# Patient Record
Sex: FEMALE | Race: WHITE | HISPANIC OR LATINO
[De-identification: names, ages, dates, MRNs, and addresses within clinical notes are randomized per-mention and may not be internally consistent; named-entity substitution may affect disease eponyms.]

---

## 2022-04-28 ENCOUNTER — APPOINTMENT (OUTPATIENT)
Dept: ORTHOPEDIC SURGERY | Facility: CLINIC | Age: 57
End: 2022-04-28
Payer: OTHER MISCELLANEOUS

## 2022-04-28 VITALS — WEIGHT: 160 LBS | HEIGHT: 62 IN | BODY MASS INDEX: 29.44 KG/M2

## 2022-04-28 DIAGNOSIS — S93.402D SPRAIN OF UNSPECIFIED LIGAMENT OF LEFT ANKLE, SUBSEQUENT ENCOUNTER: ICD-10-CM

## 2022-04-28 PROCEDURE — 99213 OFFICE O/P EST LOW 20 MIN: CPT

## 2022-04-28 PROCEDURE — 99072 ADDL SUPL MATRL&STAF TM PHE: CPT

## 2022-04-28 NOTE — PHYSICAL EXAM
[Left] : left foot and ankle [NL (40)] : plantar flexion 40 degrees [NL 30)] : inversion 30 degrees [NL (20)] : eversion 20 degrees [5___] : [facility] 5[unfilled]/5 [2+] : posterior tibialis pulse: 2+ [Normal] : saphenous nerve sensation normal [] : non-antalgic

## 2022-04-28 NOTE — HISTORY OF PRESENT ILLNESS
[Work related] : work related [0] : 0 [Sleep] : sleep [Ice] : ice [Full time] : Work status: full time [de-identified] : WC 3/1/22: Patient returns for her LT ankle sprain. Reports she lost her balance while getting into bus at work and took a misstep. She is doing much better and has no pain at this time.  She recently stopped icing and taking medication. She is s/p L ankle arthroscopy with microfx ocd by Dr. Prather 6/28/21. She She is currently working full duty as . [] : no [FreeTextEntry1] : left ankle [FreeTextEntry3] : 3-1-22 [FreeTextEntry6] : cramps in toes last night [FreeTextEntry9] : anti inflammatories [de-identified] : Dr. Nascimento [de-identified] : June 2021 with Dr. Prather [de-identified] :

## 2022-04-28 NOTE — ASSESSMENT
[FreeTextEntry1] : Patient has recovered well, and can slowly resume activities as tolerated.  Home stretching exercises were encouraged to maintain flexibility. Ice/nsaids can be used as needed.\par

## 2022-04-28 NOTE — WORK
[Sprain/Strain] : sprain/strain [Was the competent medical cause of the injury] : was the competent medical cause of the injury [Are consistent with the injury] : are consistent with the injury [Consistent with my objective findings] : consistent with my objective findings [Does not reveal pre-existing condition(s) that may affect treatment/prognosis] : does not reveal pre-existing condition(s) that may affect treatment/prognosis [N/A] : : Not Applicable [No Rx restrictions] : No Rx restrictions. [I provided the services listed above] :  I provided the services listed above. [FreeTextEntry1] : good

## 2023-07-19 ENCOUNTER — APPOINTMENT (OUTPATIENT)
Dept: ORTHOPEDIC SURGERY | Facility: CLINIC | Age: 58
End: 2023-07-19
Payer: COMMERCIAL

## 2023-07-19 DIAGNOSIS — M54.2 CERVICALGIA: ICD-10-CM

## 2023-07-19 PROCEDURE — 99214 OFFICE O/P EST MOD 30 MIN: CPT

## 2023-07-19 PROCEDURE — 73030 X-RAY EXAM OF SHOULDER: CPT | Mod: RT

## 2023-07-19 RX ORDER — CYCLOBENZAPRINE HYDROCHLORIDE 10 MG/1
10 TABLET, FILM COATED ORAL
Qty: 30 | Refills: 0 | Status: ACTIVE | COMMUNITY
Start: 2023-07-19 | End: 1900-01-01

## 2023-07-19 RX ORDER — METHYLPREDNISOLONE 4 MG/1
4 TABLET ORAL
Qty: 1 | Refills: 0 | Status: ACTIVE | COMMUNITY
Start: 2023-07-19 | End: 1900-01-01

## 2023-07-19 NOTE — HISTORY OF PRESENT ILLNESS
[de-identified] : 57-year-old female presenting to the office with right shoulder pain and right-sided neck pain for 2-3 weeks.  Denies any specific injury or trauma.  Patient reports she is a  and uses her right arm to constantly pull the door.  Admits to significant retroscapular discomfort.  Also has noticed new onset of anterior pain.  Patient denies any radicular symptoms.  She denies any numbness or tingling.  Pain is worse with motion overhead.  She is having difficulty sleeping at night.

## 2023-07-19 NOTE — PHYSICAL EXAM
[Right] : right shoulder [There are no fractures, subluxations or dislocations. No significant abnormalities are seen] : There are no fractures, subluxations or dislocations. No significant abnormalities are seen [Type 2 acromion] : Type 2 acromion [de-identified] : Constitutional: The general appearance of the patient is well developed, well nourished, no deformities and well groomed. Normal \par \par Gait: Gait and function is as follows: normal gait. \par \par Skin: Head and neck visualized skin is normal. Left upper extremity visualized skin is normal. Right upper extremity visualized skin is normal. Thoracic Skin of the thoracic spine shows visualized skin is normal. \par \par Cardiovascular: palpable radial pulse bilaterally, good capillary refill in digits of the bilateral upper extremities and no temperature or color changes in the bilateral upper extremities. \par \par Lymphatic: Normal Palpation of lymph nodes in the cervical. \par \par Neurologic: fine motor control in the bilateral upper extremities is intact. Deep Tendon Reflexes in Upper and Lower Extremities Negative Miller's in the bilateral upper extremities. The patient is oriented to time, place and person. Sensation to light touch intact in the bilateral upper extremities. Mood and Affect is normal. \par \par Right Shoulder: Inspection of the shoulder/upper arm is as follows: Stable shoulder. Palpation of the shoulder/upper arm is as follows: There is tenderness at the AC joint, proximal biceps tendon and supraspinatus tendon. Range of motion of the shoulder is as follows: Pain with internal rotation, external rotation, abduction and forward flexion. Strength of the shoulder is as follows: Supraspinatus 4/5. External Rotation 4/5. Internal Rotation 4/5. Infraspinatus 5/5 4/5. Deltoid 5/5 Ligament Stability and Special Tests of the shoulder is as follows: Neer test is positive. Gray' test is positive. Speed's test is positive. \par \par Left Shoulder: Inspection of the shoulder/upper arm is as follows: There is a full, pain-free, stable range of motion of the shoulder with normal strength and no tenderness to palpation. \par \par Neck: Inspection / Palpation of the cervical spine is as follows: There is a mild restricted range of motion of the cervical spine. Increase tone and mild tenderness to palpation. Stable ROM of cervical spine. \par \par Back, including spine: Inspection / Palpation of the thoracic/lumbar spine is as follows: There is a full, pain free, stable range of motion of the thoracic spine with a normal tone and not tenderness to palpation.. \par \par

## 2023-07-19 NOTE — DISCUSSION/SUMMARY
[de-identified] : RUE: TTP LHBT, pain with bear hug and belly press\par +Speeds referred to biceps, + Obriens\par Pain and 90/90 ER \par \par 57-year-old female with recent anterior shoulder and right-sided neck pain/trapezial pain for 2 weeks but ongoing retroscapular pain for several months\par X-rays today are nondiagnostic.\par Patient has physical exam consistent with scapular dyskinesia to the right side.\par Recommended patient begin a formal course of supervised physical therapy.\par Patient was prescribed a Medrol Dosepak as well as Flexeril to help alleviate cervical spasm.\par She will follow-up in 4 to 6 weeks for repeat evaluation.\par Discussed if pain does not improve we could consider MRI.\par \par (1) We discussed a comprehensive treatment plans that included a prescription management plan involving the use of prescription strength medications to include Ibuprofen 600-800 mg TID, versus 500-650 mg Tylenol. We also discussed prescribing topical diclofenac (Voltaren gel) as well as once daily Meloxicam 15 mg.\par \par (2) The patient has More Than One chronic injuries/illnesses as outlined, discussed, and documented by ICD 10 codes listed, as well as the HPI and Plan section.\par There is a moderate risk of morbidity with further treatment, especially from use of prescription strength medications and possible side effects of these medications which include upset stomach and cardiac/renal issues with long term use were discussed.\par \par (3) I recommended that the patient follow-up with their medical physician to discuss any significant specific potential issues with long term use such as interactions with current medications or with exacerbation of underlying medical morbidities.\par \par

## 2023-08-30 ENCOUNTER — APPOINTMENT (OUTPATIENT)
Dept: ORTHOPEDIC SURGERY | Facility: CLINIC | Age: 58
End: 2023-08-30
Payer: COMMERCIAL

## 2023-08-30 DIAGNOSIS — M25.511 PAIN IN RIGHT SHOULDER: ICD-10-CM

## 2023-08-30 DIAGNOSIS — G25.89 OTHER SPECIFIED EXTRAPYRAMIDAL AND MOVEMENT DISORDERS: ICD-10-CM

## 2023-08-30 DIAGNOSIS — M75.21 BICIPITAL TENDINITIS, RIGHT SHOULDER: ICD-10-CM

## 2023-08-30 PROCEDURE — 99213 OFFICE O/P EST LOW 20 MIN: CPT

## 2023-08-30 NOTE — DISCUSSION/SUMMARY
[de-identified] : RUE + Neer, + Gray Pain with resisted abdcution Painful arc pain and weakness with cuff testing, + Drop arm test +TTP LHBT, + Speed   57-year-old female with recent anterior shoulder and right-sided neck pain x 2.5 months Initial x-rays were nondiagnostic. Patient completed Medrol Dosepak with some relief. She has now completed 6 weeks of conservative treatment including physical therapy and home exercise program. Upon repeat evaluation today patient does have some weakness concerning for rotator cuff tear. At this stage we are recommending an MRI to rule out possibility of rotator cuff tear. Based on the patient's history and physical exam findings, I am concerned about the possibility of a full-thickness rotator cuff tear. The patient has pain and subjective weakness consistent with this diagnosis. Therefore, I recommend an MRI to evaluate for a rotator cuff tear. This will also aid in evaluating for injury to biceps labral complex as well as for any signs of impingement.  Discussed if there is a full-thickness rotator cuff tear she would be a candidate for arthroscopic repair.  She will follow-up in 4 weeks to review MRI results. Likely consider subacromial injection at that visit.   (1) We discussed a comprehensive treatment plans that included a prescription management plan involving the use of prescription strength medications to include Ibuprofen 600-800 mg TID, versus 500-650 mg Tylenol. We also discussed prescribing topical diclofenac (Voltaren gel) as well as once daily Meloxicam 15 mg.  (2) The patient has More Than One chronic injuries/illnesses as outlined, discussed, and documented by ICD 10 codes listed, as well as the HPI and Plan section. There is a moderate risk of morbidity with further treatment, especially from use of prescription strength medications and possible side effects of these medications which include upset stomach and cardiac/renal issues with long term use were discussed.  (3) I recommended that the patient follow-up with their medical physician to discuss any significant specific potential issues with long term use such as interactions with current medications or with exacerbation of underlying medical morbidities.

## 2023-08-30 NOTE — PHYSICAL EXAM
[de-identified] : Constitutional: The general appearance of the patient is well developed, well nourished, no deformities and well groomed. Normal \par  \par  Gait: Gait and function is as follows: normal gait. \par  \par  Skin: Head and neck visualized skin is normal. Left upper extremity visualized skin is normal. Right upper extremity visualized skin is normal. Thoracic Skin of the thoracic spine shows visualized skin is normal. \par  \par  Cardiovascular: palpable radial pulse bilaterally, good capillary refill in digits of the bilateral upper extremities and no temperature or color changes in the bilateral upper extremities. \par  \par  Lymphatic: Normal Palpation of lymph nodes in the cervical. \par  \par  Neurologic: fine motor control in the bilateral upper extremities is intact. Deep Tendon Reflexes in Upper and Lower Extremities Negative Miller's in the bilateral upper extremities. The patient is oriented to time, place and person. Sensation to light touch intact in the bilateral upper extremities. Mood and Affect is normal. \par  \par  Right Shoulder: Inspection of the shoulder/upper arm is as follows: Stable shoulder. Palpation of the shoulder/upper arm is as follows: There is tenderness at the AC joint, proximal biceps tendon and supraspinatus tendon. Range of motion of the shoulder is as follows: Pain with internal rotation, external rotation, abduction and forward flexion. Strength of the shoulder is as follows: Supraspinatus 4/5. External Rotation 4/5. Internal Rotation 4/5. Infraspinatus 5/5 4/5. Deltoid 5/5 Ligament Stability and Special Tests of the shoulder is as follows: Neer test is positive. Gray' test is positive. Speed's test is positive. \par  \par  Left Shoulder: Inspection of the shoulder/upper arm is as follows: There is a full, pain-free, stable range of motion of the shoulder with normal strength and no tenderness to palpation. \par  \par  Neck: Inspection / Palpation of the cervical spine is as follows: There is a mild restricted range of motion of the cervical spine. Increase tone and mild tenderness to palpation. Stable ROM of cervical spine. \par  \par  Back, including spine: Inspection / Palpation of the thoracic/lumbar spine is as follows: There is a full, pain free, stable range of motion of the thoracic spine with a normal tone and not tenderness to palpation.. \par  \par

## 2023-08-30 NOTE — HISTORY OF PRESENT ILLNESS
[de-identified] : 57-year-old female presenting to the office with right shoulder pain and right-sided neck pain for 2-3 weeks.  Denies any specific injury or trauma.  Patient reports she is a  and uses her right arm to constantly pull the door.  Admits to significant retroscapular discomfort.  Also has noticed new onset of anterior pain.  Patient denies any radicular symptoms.  She denies any numbness or tingling.  Pain is worse with motion overhead.  She is having difficulty sleeping at night.  8/30/23: Patient presents today for repeat evaluation of right shoulder pain.  Pain has been ongoing for nearly 2 to 3 months.  Previous   Medrol Dosepak provided moderate relief.  She has completed now 6 weeks of physical therapy with some progress.  Continues to note significant lateral discomfort that is worse with overhead range of motion.

## 2023-09-09 ENCOUNTER — RESULT REVIEW (OUTPATIENT)
Age: 58
End: 2023-09-09

## 2024-01-03 ENCOUNTER — APPOINTMENT (OUTPATIENT)
Dept: ORTHOPEDIC SURGERY | Facility: CLINIC | Age: 59
End: 2024-01-03
Payer: COMMERCIAL

## 2024-01-03 VITALS — WEIGHT: 160 LBS | HEIGHT: 62 IN | BODY MASS INDEX: 29.44 KG/M2

## 2024-01-03 DIAGNOSIS — M17.11 UNILATERAL PRIMARY OSTEOARTHRITIS, RIGHT KNEE: ICD-10-CM

## 2024-01-03 PROCEDURE — 99202 OFFICE O/P NEW SF 15 MIN: CPT | Mod: 25

## 2024-01-03 PROCEDURE — 73562 X-RAY EXAM OF KNEE 3: CPT | Mod: RT

## 2024-01-03 NOTE — DISCUSSION/SUMMARY
[de-identified] : Khushi has mild right knee osteoarthritis.  She states today she has no pain which is has occasional crepitus in the knee which concerned her.  She does have a history of meniscus tear in the contralateral knee and now has bone-on-bone arthritis after knee arthroscopy.  We discussed treatment options in the form of meloxicam, physical therapy, activity modifications, and possible injections.  Given that she is currently not having any pain she deferred all this was satisfied with reassurance today.  We discussed that if her knee becomes painful she could return to see me for previously mentioned treatments.  All of her questions were answered she is in agreement with this plan.  She can follow-up with me as needed.

## 2024-01-03 NOTE — HISTORY OF PRESENT ILLNESS
[7] : 7 [5] : 5 [Dull/Aching] : dull/aching [Sharp] : sharp [Intermittent] : intermittent [Nothing helps with pain getting better] : Nothing helps with pain getting better [de-identified] : HONOROI OLVERA  is a 58 year F here today for R knee crepitus but no pain.    Injury: No  Instability: Yes  Clicking/popping: yes  Does knee lock up: no Swelling/effusions: no Exacerbating activities/motions: flexion  Previous treatment: Surgery: no NSAIDs: no PT: no Injections: no Brace: no Activity mods: no   Occupation:  Recreational Activities: walking [] : no [FreeTextEntry1] : RT Knee [FreeTextEntry5] : RT knee pain. Hx of OA in the knee. Has had pain for a couple weeks. Denies injury. Denies N/T. Pt states she has moment of instability.

## 2024-01-03 NOTE — IMAGING
[de-identified] : Gait: Non-antalgic Alignment: Neutral Scars: None   R Knee: Tenderness: None ROM: 0-120 degrees Crepitus: None Effusion: None Warmth: None   Meniscal Signs: Pain on terminal extension: None Pain on terminal flexion: None McMurrays: Neg Thessaly's: Neg   Ligament Exam: Lachman: neg Post Drawer: neg Valgus stress: neg at 0 and 30 degrees Varus stress: neg at 0 and 30 degrees Pivot shift: neg Dial test: neg at 30 degrees, neg at 90 degrees   Strength: Quads: 5/5 Hamstrin/5 DF/PF/EHL 5/5   Sensation grossly intact in all dermatomes, DP/PT 2+, brisk capillary refill distally [Right] : right knee [All Views] : anteroposterior, lateral, skyline, and anteroposterior standing [There are no fractures, subluxations or dislocations. No significant abnormalities are seen] : There are no fractures, subluxations or dislocations. No significant abnormalities are seen [Moderate tricompartmental OA medial narrowing] : Moderate tricompartmental OA medial narrowing

## 2024-05-20 ENCOUNTER — APPOINTMENT (OUTPATIENT)
Dept: ORTHOPEDIC SURGERY | Facility: CLINIC | Age: 59
End: 2024-05-20
Payer: COMMERCIAL

## 2024-05-20 VITALS — HEIGHT: 62 IN | WEIGHT: 161 LBS | BODY MASS INDEX: 29.63 KG/M2

## 2024-05-20 PROCEDURE — 20526 THER INJECTION CARP TUNNEL: CPT | Mod: LT

## 2024-05-20 PROCEDURE — 73120 X-RAY EXAM OF HAND: CPT | Mod: LT

## 2024-05-20 PROCEDURE — 99213 OFFICE O/P EST LOW 20 MIN: CPT | Mod: 25

## 2024-05-20 NOTE — HISTORY OF PRESENT ILLNESS
[de-identified] : Patient presents with left hand pain for the past few weeks.  she is RHD and she states she has been nursing the Right hand and for compensation been using the left more.  She notes pain at the palmar aspect of the 2-4th MC heads region and recently has noted tingling to the 2-4 digits tips.  She deniesa ny injury.  She notes it wakes her up at night.  She has been using CT braces which helped a little at night

## 2024-05-20 NOTE — DISCUSSION/SUMMARY
[de-identified] : Extensive discussion of the options was had with the patient. This discussion included both surgical and nonsurgical options. Options including but not limited to cortisone injection, viscosupplementation, physical therapy, oral anti-inflammatories, MRI were discussed with the patient. We also discussed the option of observation, allowing the patient to continue rest, ice, prescription drug NSAIDs and following up if the condition does not improve. Time was taken to go over any questions the patient had in regard to any of the treatment plans described.   Patient reports at this time using advil , and pain/discomfort is greatest at night. Patient reports the majority of the discomfort is in the 2-4th fingers.  Plan at this time is to go forward with CSI in the left Carpal Tunnel. f/u in 1 month  Entered by Claudy Rangel acting as scribe. Dr. Armendariz Attestation The documentation recorded by the scribe, in my presence, accurately reflects the service I, Dr. Armendariz, personally performed, and the decisions made by me with my edits as appropriate.

## 2024-05-20 NOTE — PHYSICAL EXAM
[de-identified] : Constitutional: The patient appears well developed, well nourished. Examination of patients ability to communicate functionally was normal.       Neurologic: Coordination is normal. Alert and oriented to time, place and person. No evidence of mood disorder, calm affect.            LEFT HAND: Inspection of the hand/wrist is as follows: NO thenar atrophy. No swelling, ecchymosis, erythema, lacerations/abrasions, rashes, masses, deformity, nail deformity and clubbing of fingers.       Palpation of the hand/wrist is as follows: No tenderness over wrist or hand, and no palpable masses or nodules.       Range of motion of the hand/wrist is as follows in degrees:   Wrist dorsiflexion:  75    volarflexion:  85    radial deviation:  25     ulnar deviation:  40     full range of motion of wrist, full range of motion of hand, no pain with range of motion and good active flexion and extension of all finger joints.       Strength testing of the hand/wrist is as follows:    Wrist dorsiflexion strength:  5/5    Wrist volarflexion strength:   5/5    Grasp strength:   5/5    Finger abductor strength: 5/5     Pinch strength:  5/5      Special testing of the hand/wrist is as follows: positive Tinel's test and positive Phalen's test       Neurological testing of the hand/wrist is as follows: Decreased sensation to light touch over 4th finger, 2nd finger and 3rd finger.       Motor exam 5/5 about wrist, Motor exam 5/5 about hand, no focal motor deficits, palpable radial pulse and good capillary refill in all fingers.      [Left] : left hand [There are no fractures, subluxations or dislocations. No significant abnormalities are seen] : There are no fractures, subluxations or dislocations. No significant abnormalities are seen

## 2024-06-17 ENCOUNTER — APPOINTMENT (OUTPATIENT)
Dept: ORTHOPEDIC SURGERY | Facility: CLINIC | Age: 59
End: 2024-06-17

## 2024-06-17 VITALS — WEIGHT: 160 LBS | BODY MASS INDEX: 29.44 KG/M2 | HEIGHT: 62 IN

## 2024-06-17 DIAGNOSIS — G56.02 CARPAL TUNNEL SYNDROME, LEFT UPPER LIMB: ICD-10-CM

## 2024-06-17 PROCEDURE — 99212 OFFICE O/P EST SF 10 MIN: CPT

## 2024-06-17 NOTE — PHYSICAL EXAM
[de-identified] : Constitutional: The patient appears well developed, well nourished. Examination of patients ability to communicate functionally was normal.       Neurologic: Coordination is normal. Alert and oriented to time, place and person. No evidence of mood disorder, calm affect.            LEFT HAND: Inspection of the hand/wrist is as follows: NO thenar atrophy. No swelling, ecchymosis, erythema, lacerations/abrasions, rashes, masses, deformity, nail deformity and clubbing of fingers.       Palpation of the hand/wrist is as follows: No tenderness over wrist or hand, and no palpable masses or nodules.       Range of motion of the hand/wrist is as follows in degrees:   Wrist dorsiflexion:  75    volarflexion:  85    radial deviation:  25     ulnar deviation:  40     full range of motion of wrist, full range of motion of hand, no pain with range of motion and good active flexion and extension of all finger joints.       Strength testing of the hand/wrist is as follows:    Wrist dorsiflexion strength:  5/5    Wrist volarflexion strength:   5/5    Grasp strength:   5/5    Finger abductor strength: 5/5     Pinch strength:  5/5      Special testing of the hand/wrist is as follows: positive Tinel's test and positive Phalen's test       Neurological testing of the hand/wrist is as follows: Decreased sensation to light touch over 4th finger, 2nd finger and 3rd finger.       Motor exam 5/5 about wrist, Motor exam 5/5 about hand, no focal motor deficits, palpable radial pulse and good capillary refill in all fingers.

## 2024-06-17 NOTE — HISTORY OF PRESENT ILLNESS
[de-identified] : Following up on left hand. Had CSI 1 month ago with relief. Patient reports still having tingling in her hand.

## 2025-02-05 ENCOUNTER — APPOINTMENT (OUTPATIENT)
Dept: ORTHOPEDIC SURGERY | Facility: CLINIC | Age: 60
End: 2025-02-05
Payer: COMMERCIAL

## 2025-02-05 VITALS — HEIGHT: 62 IN | WEIGHT: 160 LBS | BODY MASS INDEX: 29.44 KG/M2

## 2025-02-05 DIAGNOSIS — M79.641 PAIN IN RIGHT HAND: ICD-10-CM

## 2025-02-05 PROCEDURE — 20526 THER INJECTION CARP TUNNEL: CPT | Mod: 59,RT

## 2025-02-05 PROCEDURE — 73130 X-RAY EXAM OF HAND: CPT | Mod: RT

## 2025-02-05 PROCEDURE — 20550 NJX 1 TENDON SHEATH/LIGAMENT: CPT | Mod: F9

## 2025-02-05 PROCEDURE — 99214 OFFICE O/P EST MOD 30 MIN: CPT | Mod: 25

## 2025-03-05 ENCOUNTER — APPOINTMENT (OUTPATIENT)
Dept: ORTHOPEDIC SURGERY | Facility: CLINIC | Age: 60
End: 2025-03-05
Payer: COMMERCIAL

## 2025-03-05 DIAGNOSIS — G56.01 CARPAL TUNNEL SYNDROME, RIGHT UPPER LIMB: ICD-10-CM

## 2025-03-05 PROCEDURE — 99214 OFFICE O/P EST MOD 30 MIN: CPT

## 2025-03-11 ENCOUNTER — NON-APPOINTMENT (OUTPATIENT)
Age: 60
End: 2025-03-11

## 2025-04-03 RX ORDER — HYDROCODONE BITARTRATE AND ACETAMINOPHEN 5; 325 MG/1; MG/1
5-325 TABLET ORAL
Qty: 25 | Refills: 0 | Status: ACTIVE | COMMUNITY
Start: 2025-04-03 | End: 1900-01-01

## 2025-04-04 ENCOUNTER — APPOINTMENT (OUTPATIENT)
Dept: ORTHOPEDIC SURGERY | Facility: AMBULATORY SURGERY CENTER | Age: 60
End: 2025-04-04

## 2025-04-11 ENCOUNTER — NON-APPOINTMENT (OUTPATIENT)
Age: 60
End: 2025-04-11

## 2025-04-14 ENCOUNTER — APPOINTMENT (OUTPATIENT)
Dept: ORTHOPEDIC SURGERY | Facility: CLINIC | Age: 60
End: 2025-04-14

## 2025-04-14 VITALS — HEIGHT: 62 IN | BODY MASS INDEX: 29.44 KG/M2 | WEIGHT: 160 LBS

## 2025-04-14 DIAGNOSIS — G56.01 CARPAL TUNNEL SYNDROME, RIGHT UPPER LIMB: ICD-10-CM

## 2025-04-14 PROCEDURE — 99024 POSTOP FOLLOW-UP VISIT: CPT

## 2025-05-05 ENCOUNTER — APPOINTMENT (OUTPATIENT)
Dept: ORTHOPEDIC SURGERY | Facility: CLINIC | Age: 60
End: 2025-05-05
Payer: COMMERCIAL

## 2025-05-05 DIAGNOSIS — G56.01 CARPAL TUNNEL SYNDROME, RIGHT UPPER LIMB: ICD-10-CM

## 2025-05-05 PROCEDURE — 99024 POSTOP FOLLOW-UP VISIT: CPT

## 2025-05-07 ENCOUNTER — APPOINTMENT (OUTPATIENT)
Dept: ORTHOPEDIC SURGERY | Facility: CLINIC | Age: 60
End: 2025-05-07
Payer: OTHER MISCELLANEOUS

## 2025-05-07 DIAGNOSIS — M23.91 UNSPECIFIED INTERNAL DERANGEMENT OF RIGHT KNEE: ICD-10-CM

## 2025-05-07 DIAGNOSIS — M17.11 UNILATERAL PRIMARY OSTEOARTHRITIS, RIGHT KNEE: ICD-10-CM

## 2025-05-07 PROCEDURE — 99214 OFFICE O/P EST MOD 30 MIN: CPT | Mod: 24

## 2025-05-07 PROCEDURE — 73562 X-RAY EXAM OF KNEE 3: CPT | Mod: RT

## 2025-06-04 ENCOUNTER — APPOINTMENT (OUTPATIENT)
Dept: MRI IMAGING | Facility: CLINIC | Age: 60
End: 2025-06-04
Payer: OTHER MISCELLANEOUS

## 2025-06-04 PROCEDURE — 73721 MRI JNT OF LWR EXTRE W/O DYE: CPT | Mod: RT

## 2025-06-23 ENCOUNTER — APPOINTMENT (OUTPATIENT)
Dept: ORTHOPEDIC SURGERY | Facility: CLINIC | Age: 60
End: 2025-06-23
Payer: COMMERCIAL

## 2025-06-23 PROCEDURE — 99024 POSTOP FOLLOW-UP VISIT: CPT

## 2025-07-09 ENCOUNTER — APPOINTMENT (OUTPATIENT)
Dept: ORTHOPEDIC SURGERY | Facility: CLINIC | Age: 60
End: 2025-07-09
Payer: OTHER MISCELLANEOUS

## 2025-07-09 PROCEDURE — 99213 OFFICE O/P EST LOW 20 MIN: CPT | Mod: 25

## 2025-07-09 PROCEDURE — 20610 DRAIN/INJ JOINT/BURSA W/O US: CPT | Mod: RT

## 2025-07-17 ENCOUNTER — APPOINTMENT (OUTPATIENT)
Dept: ORTHOPEDIC SURGERY | Facility: CLINIC | Age: 60
End: 2025-07-17

## 2025-08-06 ENCOUNTER — APPOINTMENT (OUTPATIENT)
Dept: ORTHOPEDIC SURGERY | Facility: CLINIC | Age: 60
End: 2025-08-06
Payer: OTHER MISCELLANEOUS

## 2025-08-06 DIAGNOSIS — M17.11 UNILATERAL PRIMARY OSTEOARTHRITIS, RIGHT KNEE: ICD-10-CM

## 2025-08-06 PROCEDURE — 99214 OFFICE O/P EST MOD 30 MIN: CPT

## 2025-09-08 ENCOUNTER — APPOINTMENT (OUTPATIENT)
Dept: ORTHOPEDIC SURGERY | Facility: CLINIC | Age: 60
End: 2025-09-08
Payer: OTHER MISCELLANEOUS

## 2025-09-08 DIAGNOSIS — M23.91 UNSPECIFIED INTERNAL DERANGEMENT OF RIGHT KNEE: ICD-10-CM

## 2025-09-08 PROCEDURE — 99213 OFFICE O/P EST LOW 20 MIN: CPT
